# Patient Record
Sex: MALE | Race: WHITE | NOT HISPANIC OR LATINO | URBAN - METROPOLITAN AREA
[De-identification: names, ages, dates, MRNs, and addresses within clinical notes are randomized per-mention and may not be internally consistent; named-entity substitution may affect disease eponyms.]

---

## 2017-10-29 ENCOUNTER — EMERGENCY (EMERGENCY)
Facility: HOSPITAL | Age: 47
LOS: 1 days | Discharge: HOME | End: 2017-10-29
Attending: EMERGENCY MEDICINE | Admitting: EMERGENCY MEDICINE
Payer: COMMERCIAL

## 2017-10-29 VITALS
RESPIRATION RATE: 20 BRPM | SYSTOLIC BLOOD PRESSURE: 111 MMHG | TEMPERATURE: 99 F | HEART RATE: 100 BPM | OXYGEN SATURATION: 99 % | DIASTOLIC BLOOD PRESSURE: 76 MMHG

## 2017-10-29 VITALS
DIASTOLIC BLOOD PRESSURE: 75 MMHG | TEMPERATURE: 98 F | OXYGEN SATURATION: 95 % | SYSTOLIC BLOOD PRESSURE: 111 MMHG | RESPIRATION RATE: 16 BRPM | HEART RATE: 97 BPM

## 2017-10-29 PROCEDURE — 71100 X-RAY EXAM RIBS UNI 2 VIEWS: CPT | Mod: 26

## 2017-10-29 PROCEDURE — 71100 X-RAY EXAM RIBS UNI 2 VIEWS: CPT

## 2017-10-29 PROCEDURE — 99284 EMERGENCY DEPT VISIT MOD MDM: CPT

## 2017-10-29 PROCEDURE — 99283 EMERGENCY DEPT VISIT LOW MDM: CPT | Mod: 25

## 2017-10-29 RX ORDER — LIDOCAINE 4 G/100G
1 CREAM TOPICAL ONCE
Qty: 0 | Refills: 0 | Status: COMPLETED | OUTPATIENT
Start: 2017-10-29 | End: 2017-10-29

## 2017-10-29 RX ADMIN — LIDOCAINE 1 PATCH: 4 CREAM TOPICAL at 17:17

## 2017-10-29 NOTE — ED PROVIDER NOTE - PROGRESS NOTE DETAILS
Michelle Allen, DO: Pt feeling better after lidocaine patch. Prelim read states no fx. WIll go home and receive call if read is positive.

## 2017-10-29 NOTE — ED ADULT NURSE NOTE - OBJECTIVE STATEMENT
46 y/o M presents ambulatory to ED s/p fall through milk crate, hit R ribs on sofa arm, c/o 3/10 pain at rest and 6/10 when standing up/moving/deep inspiration. Pt states "the area feels a little bloated, like a pressure." Pt denies SOB, chest pain, palpitations, dyspnea, pt did not hit head, no LOC. Safety maintained, steady gait noted.

## 2017-10-29 NOTE — ED PROVIDER NOTE - CARE PLAN
Principal Discharge DX:	Fall  Instructions for follow-up, activity and diet:	Call your primary care doctor today or tomorrow to schedule follow up appointment for within the next 3-5 days.  Continue taking your medications as prescribed.  Return to this Emergency Department if you experience worsening condition or for any other emergency.

## 2017-10-29 NOTE — ED ADULT NURSE NOTE - DISCHARGE TEACHING
Pt d/c by MD Allen Pt d/c by MD Allen, pt verbalizes understanding to remove lidoderm patch at 0517.

## 2017-10-29 NOTE — ED PROVIDER NOTE - OBJECTIVE STATEMENT
47M h/o DM presents after falling on his right side onto a sofa arm chair while fixing his kitchen, last night. He took naproxen, but felt uncomfortable through the night and woke up with increased pain and swelling in the area. Has no other symptoms, no abdominal pain, just discomfort when bending down, no fevers, GI/ complaints. Denies hitting head.

## 2019-12-04 NOTE — ED PROVIDER NOTE - MEDICAL DECISION MAKING DETAILS
47M s/p fall. Possible rib fx (Rib xray), r/o abdominal injury (Abdominal FAST). If fx positive, no internal injury, D/C home with f/u with PMD. 47M s/p fall. Possible rib fx (Rib xray). D/C home with f/u with PMD. [facility] MEDINA: Patient is a 46 yo M s/p fall onto sofa arm - hitting right chest. Exam: + focal tenderness, no skin findings, lungs CTA, no other focal findings. a/p: r/o rib fx, pain control and reassess.

## 2021-03-10 NOTE — ED ADULT TRIAGE NOTE - TEMPERATURE IN FAHRENHEIT (DEGREES F)
98.6 Opioid Counseling: I discussed with the patient the potential side effects of opioids including but not limited to addiction, altered mental status, and depression. I stressed avoiding alcohol, benzodiazepines, muscle relaxants and sleep aids unless specifically okayed by a physician. The patient verbalized understanding of the proper use and possible adverse effects of opioids. All of the patient's questions and concerns were addressed. They were instructed to flush the remaining pills down the toilet if they did not need them for pain.

## 2023-03-17 ENCOUNTER — APPOINTMENT (OUTPATIENT)
Dept: PULMONOLOGY | Facility: CLINIC | Age: 53
End: 2023-03-17
Payer: COMMERCIAL

## 2023-03-17 VITALS
HEART RATE: 96 BPM | DIASTOLIC BLOOD PRESSURE: 79 MMHG | WEIGHT: 262 LBS | OXYGEN SATURATION: 94 % | BODY MASS INDEX: 39.71 KG/M2 | SYSTOLIC BLOOD PRESSURE: 113 MMHG | HEIGHT: 68 IN | TEMPERATURE: 97.6 F

## 2023-03-17 PROCEDURE — 99203 OFFICE O/P NEW LOW 30 MIN: CPT

## 2023-03-17 RX ORDER — LOSARTAN POTASSIUM 100 MG/1
TABLET, FILM COATED ORAL
Refills: 0 | Status: ACTIVE | COMMUNITY

## 2023-03-17 RX ORDER — TAFASITAMAB-CXIX 200 MG/5ML
INJECTION, POWDER, LYOPHILIZED, FOR SOLUTION INTRAVENOUS
Refills: 0 | Status: ACTIVE | COMMUNITY

## 2023-03-17 RX ORDER — GLIPIZIDE 2.5 MG/1
TABLET ORAL
Refills: 0 | Status: ACTIVE | COMMUNITY

## 2023-03-17 RX ORDER — OMEPRAZOLE MAGNESIUM 20 MG/1
TABLET, DELAYED RELEASE ORAL
Refills: 0 | Status: ACTIVE | COMMUNITY

## 2023-03-17 RX ORDER — ATORVASTATIN CALCIUM 80 MG/1
TABLET, FILM COATED ORAL
Refills: 0 | Status: ACTIVE | COMMUNITY

## 2023-03-17 RX ORDER — DAPAGLIFLOZIN 10 MG/1
TABLET, FILM COATED ORAL
Refills: 0 | Status: ACTIVE | COMMUNITY

## 2023-03-17 RX ORDER — INSULIN DEGLUDEC INJECTION 100 U/ML
INJECTION, SOLUTION SUBCUTANEOUS
Refills: 0 | Status: ACTIVE | COMMUNITY

## 2023-03-17 RX ORDER — AMLODIPINE BESYLATE 5 MG/1
TABLET ORAL
Refills: 0 | Status: ACTIVE | COMMUNITY

## 2023-03-17 NOTE — PROCEDURE
[FreeTextEntry1] : Sleep study demonstrates severe ANDRIA AHI 82 events per hour with severe oxygen desaturation.\par \par The possibility of central apnea was mentioned as well

## 2023-03-17 NOTE — HISTORY OF PRESENT ILLNESS
[Obstructive Sleep Apnea] : obstructive sleep apnea [Frequent Nocturnal Awakening] : frequent nocturnal awakening [Recent  Weight Gain] : recent weight gain [Snoring] : snoring [Witnessed Apneas] : witnessed apneas [TextBox_4] : Patient referred for sleep apnea treatment.  Presented with snoring daytime sleepiness and nonrestorative sleep.  Referred for sleep study showing severe sleep apnea [Awakes Unrefreshed] : does not awaken unrefreshed [Awakes with Dry Mouth] : does not awaken with dry mouth [Awakes with Headache] : does not awaken with headache

## 2023-03-17 NOTE — ASSESSMENT
[FreeTextEntry1] : Severe ANDRIA noted on testing.  It is unlikely patient has central apnea or Cheyne-Galvez respiration as reported on the home study.  Recommend CPAP trial.  Should have follow-up data download and oximetry if there is a good clinical response and follow-up data looks good that will be acceptable otherwise will need titration study in the future.\par Results of sleep study reviewed with patient. Treatment options including weight loss oral appliance therapy and PAP therapy were reviewed with patient. After careful review of sleep study patient desire and risk factors recommend initial therapy with PAP. Will order auto titrating device.\par

## 2023-03-17 NOTE — PHYSICAL EXAM
[No Acute Distress] : no acute distress [Normal Oropharynx] : normal oropharynx [Normal Appearance] : normal appearance [No Neck Mass] : no neck mass [Normal Rate/Rhythm] : normal rate/rhythm [Normal S1, S2] : normal s1, s2 [No Murmurs] : no murmurs [No Resp Distress] : no resp distress [Clear to Auscultation Bilaterally] : clear to auscultation bilaterally [No Abnormalities] : no abnormalities [Benign] : benign [Normal Gait] : normal gait [No Clubbing] : no clubbing [No Cyanosis] : no cyanosis [No Edema] : no edema [FROM] : FROM [Normal Color/ Pigmentation] : normal color/ pigmentation [No Focal Deficits] : no focal deficits [Oriented x3] : oriented x3 [Normal Affect] : normal affect [TextBox_2] : Obese male

## 2023-04-28 ENCOUNTER — APPOINTMENT (OUTPATIENT)
Dept: PULMONOLOGY | Facility: CLINIC | Age: 53
End: 2023-04-28
Payer: COMMERCIAL

## 2023-04-28 PROCEDURE — 99213 OFFICE O/P EST LOW 20 MIN: CPT | Mod: 95

## 2023-04-28 NOTE — ASSESSMENT
[FreeTextEntry1] : Making excellent initial compliance with CPAP and feels better.  Have raised pressure range up to 10-17.  Also raised initial pressure on ramp feature.  Follow-up in office in 1 month for data download review and overnight pulse oximetry follow-up

## 2023-04-28 NOTE — HISTORY OF PRESENT ILLNESS
[TextBox_4] : Telehealth follow-up.  Received auto CPAP device using on nightly basis for the past 2 weeks feels much better since using device sleeping well feels much more rested and refreshed reports no difficulty with device usage

## 2023-04-28 NOTE — PROCEDURE
[FreeTextEntry1] : 14 days of data reviewed AHI 10.7 on treatment average pressure 12.2 with maximum 14.7

## 2023-07-10 ENCOUNTER — APPOINTMENT (OUTPATIENT)
Dept: PULMONOLOGY | Facility: CLINIC | Age: 53
End: 2023-07-10
Payer: COMMERCIAL

## 2023-07-10 VITALS — OXYGEN SATURATION: 95 % | HEART RATE: 90 BPM | DIASTOLIC BLOOD PRESSURE: 79 MMHG | SYSTOLIC BLOOD PRESSURE: 105 MMHG

## 2023-07-10 DIAGNOSIS — G47.33 OBSTRUCTIVE SLEEP APNEA (ADULT) (PEDIATRIC): ICD-10-CM

## 2023-07-10 PROCEDURE — 99213 OFFICE O/P EST LOW 20 MIN: CPT

## 2023-07-10 NOTE — PROCEDURE
[FreeTextEntry1] : Compliance Report\par Usage 06/10/2023 - 07/09/2023\par Usage days 29/30 days (97%)\par >= 4 hours 29 days (97%)\par < 4 hours 0 days (0%)\par Usage hours 200 hours 58 minutes\par Average usage (total days) 6 hours 42 minutes\par Average usage (days used) 6 hours 56 minutes\par Median usage (days used) 6 hours 54 minutes\par Total used hours (value since last reset - 07/09/2023) 582 hours\par AirSense 11 AutoSet\par Serial number [id number]\par Mode AutoSet\par Min Pressure 8 cmH2O\par Max Pressure 15 cmH2O\par EPR Ramp Only\par EPR level 3\par Response Standard\par Therapy\par Pressure - cmH2O Median: 11.1 95th percentile: 13.6 Maximum: 14.4\par Leaks - L/min Median: 7.7 95th percentile: 29.0 Maximum: 55.5\par Events per hour AI: 6.3 HI: 1.1 AHI: 7.4\par Apnea Index Central: 0.5 Obstructive: 3.8 Unknown: 1.9\par RERA Index 0.5\par Cheyne-Galvez respiration (average duration per night) 0 minutes (0%)

## 2023-07-10 NOTE — ASSESSMENT
[FreeTextEntry1] : Mr. ROSA ALONZO is an 53 year old male with obstructive sleep apnea. Patient is currently on treatment with PAP. Data download confirms excellent compliance. Patient continues to use treatment and is benefiting from it. \par \par I am raising the pressure somewhat as the AHI is above 5.  I gave him a replacement mask.  He will eventually need an overnight pulse oximetry but I recommend deferring this as I am making the change in his CPAP settings today\par \par

## 2023-07-10 NOTE — ADDENDUM
[FreeTextEntry1] : I, Kemar Vorandolph, acted solely as a scribe for Dr. Noble oCburn M.D. on this date 07/10/2023. \par \par All medical record entries made by the Scribe were at my, Dr. Noble Coburn M.D., direction and personally dictated by me on 07/10/2023. I have reviewed the chart and agree that the record accurately reflects my personal performance of the history, physical exam, assessment and plan. I have also personally directed, reviewed, and agreed with the chart.

## 2023-07-10 NOTE — HISTORY OF PRESENT ILLNESS
[FreeTextEntry1] : ROSA ALONZO is a 53 year old male, with history of obstructive sleep apnea, who presents to the office for follow up evaluation. Reports no current respiratory symptoms or sleep symptoms. Using PAP on a nightly basis without difficulty. Reports no symptoms of daytime sleepiness. \par \par Device: Resmed S11 AutoSet\par \par Mask: DreamWear (M) w/ small pillow\par \par Settin-15\par \par Issues: Has not received a replacement mask

## 2023-09-07 ENCOUNTER — EMERGENCY (EMERGENCY)
Facility: HOSPITAL | Age: 53
LOS: 1 days | Discharge: HOME | End: 2023-09-07
Attending: STUDENT IN AN ORGANIZED HEALTH CARE EDUCATION/TRAINING PROGRAM
Payer: COMMERCIAL

## 2023-09-07 VITALS
DIASTOLIC BLOOD PRESSURE: 70 MMHG | OXYGEN SATURATION: 97 % | TEMPERATURE: 99 F | HEART RATE: 78 BPM | RESPIRATION RATE: 16 BRPM | SYSTOLIC BLOOD PRESSURE: 109 MMHG

## 2023-09-07 VITALS
HEART RATE: 80 BPM | DIASTOLIC BLOOD PRESSURE: 80 MMHG | HEIGHT: 68 IN | SYSTOLIC BLOOD PRESSURE: 117 MMHG | WEIGHT: 255.07 LBS | TEMPERATURE: 99 F | RESPIRATION RATE: 16 BRPM | OXYGEN SATURATION: 96 %

## 2023-09-07 LAB
ALBUMIN SERPL ELPH-MCNC: 4.5 G/DL — SIGNIFICANT CHANGE UP (ref 3.3–5)
ALP SERPL-CCNC: 63 U/L — SIGNIFICANT CHANGE UP (ref 40–120)
ALT FLD-CCNC: 35 U/L — SIGNIFICANT CHANGE UP (ref 10–45)
ANION GAP SERPL CALC-SCNC: 11 MMOL/L — SIGNIFICANT CHANGE UP (ref 5–17)
APPEARANCE UR: CLEAR — SIGNIFICANT CHANGE UP
AST SERPL-CCNC: 27 U/L — SIGNIFICANT CHANGE UP (ref 10–40)
BASE EXCESS BLDV CALC-SCNC: 6 MMOL/L — HIGH (ref -2–3)
BASOPHILS # BLD AUTO: 0.07 K/UL — SIGNIFICANT CHANGE UP (ref 0–0.2)
BASOPHILS NFR BLD AUTO: 1 % — SIGNIFICANT CHANGE UP (ref 0–2)
BILIRUB SERPL-MCNC: 0.5 MG/DL — SIGNIFICANT CHANGE UP (ref 0.2–1.2)
BILIRUB UR-MCNC: NEGATIVE — SIGNIFICANT CHANGE UP
BUN SERPL-MCNC: 18 MG/DL — SIGNIFICANT CHANGE UP (ref 7–23)
CA-I SERPL-SCNC: 1.25 MMOL/L — SIGNIFICANT CHANGE UP (ref 1.15–1.33)
CALCIUM SERPL-MCNC: 9.6 MG/DL — SIGNIFICANT CHANGE UP (ref 8.4–10.5)
CHLORIDE BLDV-SCNC: 102 MMOL/L — SIGNIFICANT CHANGE UP (ref 96–108)
CHLORIDE SERPL-SCNC: 103 MMOL/L — SIGNIFICANT CHANGE UP (ref 96–108)
CO2 BLDV-SCNC: 34 MMOL/L — HIGH (ref 22–26)
CO2 SERPL-SCNC: 28 MMOL/L — SIGNIFICANT CHANGE UP (ref 22–31)
COLOR SPEC: SIGNIFICANT CHANGE UP
CREAT SERPL-MCNC: 1.11 MG/DL — SIGNIFICANT CHANGE UP (ref 0.5–1.3)
DIFF PNL FLD: NEGATIVE — SIGNIFICANT CHANGE UP
EGFR: 80 ML/MIN/1.73M2 — SIGNIFICANT CHANGE UP
EOSINOPHIL # BLD AUTO: 0.17 K/UL — SIGNIFICANT CHANGE UP (ref 0–0.5)
EOSINOPHIL NFR BLD AUTO: 2.3 % — SIGNIFICANT CHANGE UP (ref 0–6)
GAS PNL BLDV: 137 MMOL/L — SIGNIFICANT CHANGE UP (ref 136–145)
GAS PNL BLDV: SIGNIFICANT CHANGE UP
GAS PNL BLDV: SIGNIFICANT CHANGE UP
GLUCOSE BLDV-MCNC: 141 MG/DL — HIGH (ref 70–99)
GLUCOSE SERPL-MCNC: 138 MG/DL — HIGH (ref 70–99)
GLUCOSE UR QL: ABNORMAL
HCO3 BLDV-SCNC: 33 MMOL/L — HIGH (ref 22–29)
HCT VFR BLD CALC: 44.5 % — SIGNIFICANT CHANGE UP (ref 39–50)
HCT VFR BLDA CALC: 44 % — SIGNIFICANT CHANGE UP (ref 39–51)
HGB BLD CALC-MCNC: 14.7 G/DL — SIGNIFICANT CHANGE UP (ref 12.6–17.4)
HGB BLD-MCNC: 14.7 G/DL — SIGNIFICANT CHANGE UP (ref 13–17)
IMM GRANULOCYTES NFR BLD AUTO: 0.7 % — SIGNIFICANT CHANGE UP (ref 0–0.9)
KETONES UR-MCNC: NEGATIVE — SIGNIFICANT CHANGE UP
LACTATE BLDV-MCNC: 1.6 MMOL/L — SIGNIFICANT CHANGE UP (ref 0.5–2)
LEUKOCYTE ESTERASE UR-ACNC: NEGATIVE — SIGNIFICANT CHANGE UP
LIDOCAIN IGE QN: 91 U/L — HIGH (ref 7–60)
LYMPHOCYTES # BLD AUTO: 2.05 K/UL — SIGNIFICANT CHANGE UP (ref 1–3.3)
LYMPHOCYTES # BLD AUTO: 28 % — SIGNIFICANT CHANGE UP (ref 13–44)
MCHC RBC-ENTMCNC: 27.7 PG — SIGNIFICANT CHANGE UP (ref 27–34)
MCHC RBC-ENTMCNC: 33 GM/DL — SIGNIFICANT CHANGE UP (ref 32–36)
MCV RBC AUTO: 84 FL — SIGNIFICANT CHANGE UP (ref 80–100)
MONOCYTES # BLD AUTO: 0.66 K/UL — SIGNIFICANT CHANGE UP (ref 0–0.9)
MONOCYTES NFR BLD AUTO: 9 % — SIGNIFICANT CHANGE UP (ref 2–14)
NEUTROPHILS # BLD AUTO: 4.32 K/UL — SIGNIFICANT CHANGE UP (ref 1.8–7.4)
NEUTROPHILS NFR BLD AUTO: 59 % — SIGNIFICANT CHANGE UP (ref 43–77)
NITRITE UR-MCNC: NEGATIVE — SIGNIFICANT CHANGE UP
NRBC # BLD: 0 /100 WBCS — SIGNIFICANT CHANGE UP (ref 0–0)
PCO2 BLDV: 54 MMHG — SIGNIFICANT CHANGE UP (ref 42–55)
PH BLDV: 7.39 — SIGNIFICANT CHANGE UP (ref 7.32–7.43)
PH UR: 6 — SIGNIFICANT CHANGE UP (ref 5–8)
PLATELET # BLD AUTO: 240 K/UL — SIGNIFICANT CHANGE UP (ref 150–400)
PO2 BLDV: 26 MMHG — SIGNIFICANT CHANGE UP (ref 25–45)
POTASSIUM BLDV-SCNC: 3.8 MMOL/L — SIGNIFICANT CHANGE UP (ref 3.5–5.1)
POTASSIUM SERPL-MCNC: 3.9 MMOL/L — SIGNIFICANT CHANGE UP (ref 3.5–5.3)
POTASSIUM SERPL-SCNC: 3.9 MMOL/L — SIGNIFICANT CHANGE UP (ref 3.5–5.3)
PROT SERPL-MCNC: 7.4 G/DL — SIGNIFICANT CHANGE UP (ref 6–8.3)
PROT UR-MCNC: NEGATIVE — SIGNIFICANT CHANGE UP
RBC # BLD: 5.3 M/UL — SIGNIFICANT CHANGE UP (ref 4.2–5.8)
RBC # FLD: 12.9 % — SIGNIFICANT CHANGE UP (ref 10.3–14.5)
SAO2 % BLDV: 39.3 % — LOW (ref 67–88)
SODIUM SERPL-SCNC: 142 MMOL/L — SIGNIFICANT CHANGE UP (ref 135–145)
SP GR SPEC: 1.03 — HIGH (ref 1.01–1.02)
UROBILINOGEN FLD QL: NEGATIVE — SIGNIFICANT CHANGE UP
WBC # BLD: 7.32 K/UL — SIGNIFICANT CHANGE UP (ref 3.8–10.5)
WBC # FLD AUTO: 7.32 K/UL — SIGNIFICANT CHANGE UP (ref 3.8–10.5)

## 2023-09-07 PROCEDURE — 80053 COMPREHEN METABOLIC PANEL: CPT

## 2023-09-07 PROCEDURE — 74177 CT ABD & PELVIS W/CONTRAST: CPT | Mod: 26,MA

## 2023-09-07 PROCEDURE — 82803 BLOOD GASES ANY COMBINATION: CPT

## 2023-09-07 PROCEDURE — 82330 ASSAY OF CALCIUM: CPT

## 2023-09-07 PROCEDURE — 82435 ASSAY OF BLOOD CHLORIDE: CPT

## 2023-09-07 PROCEDURE — 83605 ASSAY OF LACTIC ACID: CPT

## 2023-09-07 PROCEDURE — 85018 HEMOGLOBIN: CPT

## 2023-09-07 PROCEDURE — 83690 ASSAY OF LIPASE: CPT

## 2023-09-07 PROCEDURE — 84295 ASSAY OF SERUM SODIUM: CPT

## 2023-09-07 PROCEDURE — 84132 ASSAY OF SERUM POTASSIUM: CPT

## 2023-09-07 PROCEDURE — 36415 COLL VENOUS BLD VENIPUNCTURE: CPT

## 2023-09-07 PROCEDURE — 93005 ELECTROCARDIOGRAM TRACING: CPT

## 2023-09-07 PROCEDURE — 87086 URINE CULTURE/COLONY COUNT: CPT

## 2023-09-07 PROCEDURE — 85025 COMPLETE CBC W/AUTO DIFF WBC: CPT

## 2023-09-07 PROCEDURE — 76705 ECHO EXAM OF ABDOMEN: CPT | Mod: 26

## 2023-09-07 PROCEDURE — 82947 ASSAY GLUCOSE BLOOD QUANT: CPT

## 2023-09-07 PROCEDURE — 99285 EMERGENCY DEPT VISIT HI MDM: CPT

## 2023-09-07 PROCEDURE — 99285 EMERGENCY DEPT VISIT HI MDM: CPT | Mod: 25

## 2023-09-07 PROCEDURE — 85014 HEMATOCRIT: CPT

## 2023-09-07 PROCEDURE — 76705 ECHO EXAM OF ABDOMEN: CPT

## 2023-09-07 PROCEDURE — 81003 URINALYSIS AUTO W/O SCOPE: CPT

## 2023-09-07 PROCEDURE — 74177 CT ABD & PELVIS W/CONTRAST: CPT | Mod: MA

## 2023-09-07 RX ORDER — SODIUM CHLORIDE 9 MG/ML
1000 INJECTION INTRAMUSCULAR; INTRAVENOUS; SUBCUTANEOUS ONCE
Refills: 0 | Status: COMPLETED | OUTPATIENT
Start: 2023-09-07 | End: 2023-09-07

## 2023-09-07 RX ORDER — ACETAMINOPHEN 500 MG
975 TABLET ORAL ONCE
Refills: 0 | Status: COMPLETED | OUTPATIENT
Start: 2023-09-07 | End: 2023-09-07

## 2023-09-07 RX ADMIN — SODIUM CHLORIDE 1000 MILLILITER(S): 9 INJECTION INTRAMUSCULAR; INTRAVENOUS; SUBCUTANEOUS at 12:16

## 2023-09-07 RX ADMIN — Medication 975 MILLIGRAM(S): at 17:37

## 2023-09-07 NOTE — ED ADULT NURSE NOTE - OBJECTIVE STATEMENT
Patient came in c/o abdominal worsening abdominal pain for a few days. Pt also c/o diarrhea. Pt [place]. Pt states he also saw some "green looking balls" in his stool 2 days ago. Pt also reports COVID+ August 29, 2023. Pt is alert and orientedX4. No distress. Breathing easy and non labored. Pt Is ambulatory. Pt is anxious. Emotional support offered.

## 2023-09-07 NOTE — ED ADULT NURSE NOTE - NSFALLUNIVINTERV_ED_ALL_ED
Bed/Stretcher in lowest position, wheels locked, appropriate side rails in place/Call bell, personal items and telephone in reach/Instruct patient to call for assistance before getting out of bed/chair/stretcher/Non-slip footwear applied when patient is off stretcher/[place] to call system/Physically safe environment - no spills, clutter or unnecessary equipment/Purposeful proactive rounding/Room/bathroom lighting operational, light cord in reach

## 2023-09-07 NOTE — ED PROVIDER NOTE - NSFOLLOWUPINSTRUCTIONS_ED_ALL_ED_FT
Today in the emergency department you were evaluated for right-sided abdominal pain.  This is likely due to a viral illness.  Incidentally on your CT of your abdomen there was some inflammation of your left kidney and ureter.  Please follow-up with a urologist for further assessment of this.  It is possible that you passed a stone before your presentation to the emergency department.  Please return to the emergency department for any worsening of your symptoms.    Please follow up with your primary care physician within 1 week of discharge from the emergency department.  Please bring a copy of your results with you.  Please return to the emergency department for worsening of your symptoms.    You may take Acetaminophen over the counter as needed for pain and/or fever. Use as directed and see medication warnings.  You may take Ibuprofen over the counter as needed for pain and/or fever. Use as directed and see medication warnings.    DISCHARGE INSTRUCTIONS:  Seek care immediately if:  You vomit blood or cannot stop vomiting.  You have blood in your bowel movement or it looks like tar.  You have bleeding from your rectum.  Your abdomen is larger than usual, more painful, and hard.  You have severe pain in your abdomen.  You stop passing gas and having bowel movements.  You feel weak, dizzy, or faint.    Contact your healthcare provider if:  You have a fever.  You have new signs and symptoms.  Your symptoms do not get better with treatment.  You have questions or concerns about your condition or care.

## 2023-09-07 NOTE — ED PROVIDER NOTE - PATIENT PORTAL LINK FT
You can access the FollowMyHealth Patient Portal offered by [facility] by registering at the following website: [email or web address]. By joining [facility]’s FollowMyHealth portal, you will also be able to view your health information using other applications (apps) compatible with our system.

## 2023-09-07 NOTE — ED PROVIDER NOTE - CLINICAL SUMMARY MEDICAL DECISION MAKING FREE TEXT BOX
Patient is a 53y Male w/ PMHx of diabetes, HTN, & renal calculi who presents to ED w/ complaints of non-radiating RUQ discomfort onset 2-3 days that was initially intermittent but today progressed to constant discomfort unrelieved from acetaminophen or movement. Plan is to order CT Abd/Pelvis w/ IV contrast & US abdomen RUQ to evaluate for renal calculi, biliary calculi, or biliary colic. Order CBC, CMP, VBG, Urine Cx, & lipase to evaluate for infection/acute leukocytosis. Order IV NS for hydration.

## 2023-09-07 NOTE — ED PROVIDER NOTE - PROGRESS NOTE DETAILS
CT abdomen pelvis shows mild fullness at the level of the left renal pelvis and focal dilatation of the distal left ureter without stone.  RUQ US shows no sonographic evidence for acute cholecystitis.  Abdominal pain likely viral in etiology.  Discharge home with urology follow-up for further ureteral evaluation.

## 2023-09-07 NOTE — ED PROVIDER NOTE - OBJECTIVE STATEMENT
Patient is a 53y Male w/ PMHx of diabetes, HTN, & renal calculus who presents to ED w/ complaints of non-radiating RUQ discomfort onset 2-3 days that was initially intermittent but today progressed to constant discomfort unrelieved from acetaminophen or movement. Patient endorses recent return on 8/25 from [facility] that resulted in food poisoning (n/v/d-since resolved) & tested +COVID19 on 8/29. Patient reports "green balls/nodules" in stool 2 days ago, & movement of gas/bowels today. Patient denies fever, chills, decreased PO intake, dysuria, hematuria, melena, or penile discharge. Patient states last episode of passing renal calculi was approx 10yrs ago. Patient reports compliance to medications.

## 2023-09-07 NOTE — ED PROVIDER NOTE - ATTENDING CONTRIBUTION TO CARE
History and physical as documented above.  Patient's overall exam benign, abdomen non-peritonitic. Will check pre-ops, CT a/p to evaluate for appendicitis vs kidney stone, RUQ U/S to evaluate for cholecystitis, symptom management, disposition pending work-up and response to treatment.

## 2023-09-08 LAB
CULTURE RESULTS: NO GROWTH — SIGNIFICANT CHANGE UP
SPECIMEN SOURCE: SIGNIFICANT CHANGE UP

## 2023-10-09 ENCOUNTER — APPOINTMENT (OUTPATIENT)
Dept: PULMONOLOGY | Facility: CLINIC | Age: 53
End: 2023-10-09

## 2023-11-09 ENCOUNTER — APPOINTMENT (OUTPATIENT)
Dept: PULMONOLOGY | Facility: CLINIC | Age: 53
End: 2023-11-09